# Patient Record
(demographics unavailable — no encounter records)

---

## 2025-03-07 NOTE — HISTORY OF PRESENT ILLNESS
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 States

## 2025-03-07 NOTE — HISTORY OF PRESENT ILLNESS
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 States

## 2025-03-07 NOTE — PHYSICAL EXAM
[Right] : right knee [NL (0)] : extension 0 degrees [5___] : hamstring 5[unfilled]/5 [Equivocal] : equivocal Arpita [] : patient ambulates without assistive device [TWNoteComboBox7] : flexion 105 degrees

## 2025-03-07 NOTE — DISCUSSION/SUMMARY
[de-identified] : General Dx Discussion The patient was advised of the diagnosis. The natural history of the pathology was explained in full to the patient in layman's terms. All questions were answered. The risks and benefits of surgical and non-surgical treatment alternatives were explained in full to the patient.  due to sudden pain and loss of motion with symptoms of locking will get a mri rt knee to eval for meniscal tear MCL sprain  will get a HKB  f/u after mri  will try mobic 7.5 mg po qd  Patient was given a prescription for an anti-inflammatory medication.  They will take it for the next 5-7 days and then on an as needed basis, as long as there are no medical contra-indications.  Patient is counseled on possible GI and blood pressure side effects. will use ice

## 2025-03-07 NOTE — DISCUSSION/SUMMARY
[de-identified] : General Dx Discussion The patient was advised of the diagnosis. The natural history of the pathology was explained in full to the patient in layman's terms. All questions were answered. The risks and benefits of surgical and non-surgical treatment alternatives were explained in full to the patient.  due to sudden pain and loss of motion with symptoms of locking will get a mri rt knee to eval for meniscal tear MCL sprain  will get a HKB  f/u after mri  will try mobic 7.5 mg po qd  Patient was given a prescription for an anti-inflammatory medication.  They will take it for the next 5-7 days and then on an as needed basis, as long as there are no medical contra-indications.  Patient is counseled on possible GI and blood pressure side effects. will use ice

## 2025-03-21 NOTE — PHYSICAL EXAM
[Right] : right knee [NL (0)] : extension 0 degrees [5___] : hamstring 5[unfilled]/5 [Equivocal] : equivocal Arpita [] : no pain with varus stress [TWNoteComboBox7] : flexion 105 degrees

## 2025-03-21 NOTE — DISCUSSION/SUMMARY
[de-identified] : General Dx Discussion The patient was advised of the diagnosis. The natural history of the pathology was explained in full to the patient in layman's terms. All questions were answered. The risks and benefits of surgical and non-surgical treatment alternatives were explained in full to the patient.  mri reviewed  advised best option is TKA she does not want surgery at this time  will request srinivas lt knee  f/u auth

## 2025-03-21 NOTE — HISTORY OF PRESENT ILLNESS
[Gradual] : gradual [Result of repetitive motion] : result of repetitive motion [3] : 3 [Dull/Aching] : dull/aching [Nothing helps with pain getting better] : Nothing helps with pain getting better [Full time] : Work status: full time [de-identified] : 03/21/2025: Patient is here for MRI results of her right knee.  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

## 2025-03-31 NOTE — DISCUSSION/SUMMARY
[de-identified] : General Dx Discussion The patient was advised of the diagnosis. The natural history of the pathology was explained in full to the patient in layman's terms. All questions were answered. The risks and benefits of surgical and non-surgical treatment alternatives were explained in full to the patient.  Case discussed. Will get a L/S MRI to eval the hnp. D/C mobic. Will start a medrol dose pack. Will also put in for visco-3. Follow up after MRI.    Entered by CHRIS Raymond acting as scribe. - The documentation recorded by the scribe accurately reflects the service I personally performed and the decisions made by me.

## 2025-03-31 NOTE — PHYSICAL EXAM
[NL (0)] : extension 0 degrees [5___] : hamstring 5[unfilled]/5 [Equivocal] : equivocal Arpita [Right] : right hip [] : no pain with varus stress [FreeTextEntry8] : Tenderness over distal hamstring. [TWNoteComboBox7] : flexion 105 degrees

## 2025-04-10 NOTE — HISTORY OF PRESENT ILLNESS
[Gradual] : gradual [Result of repetitive motion] : result of repetitive motion [0] : 0 [Dull/Aching] : dull/aching [Tightness] : tightness [Frequent] : frequent [Household chores] : household chores [Leisure] : leisure [Work] : work [Walking] : walking [6] : 6 [Meds] : meds [Not working due to injury] : Work status: not working due to injury [de-identified] : 4/10/2025-Isabel is here for MRI results of his lumbar spine and right knee. Pain has slightly improved.  3/31/25-Patient is here for a follow up on her right knee. Having increased pain since Thursday 3/27. Reports tightness in her calf and distal thigh. Has known l/s multi level hnp.  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

## 2025-04-10 NOTE — DATA REVIEWED
[MRI] : MRI [Lumbar Spine] : lumbar spine [Report was reviewed and noted in the chart] : The report was reviewed and noted in the chart [I independently reviewed and interpreted images and report] : I independently reviewed and interpreted images and report [I reviewed the films/CD] : I reviewed the films/CD [FreeTextEntry1] : spinal stenosis and foraminal stenosis disc bulge and degenerative disc disease

## 2025-04-10 NOTE — PROCEDURE
[Large Joint Injection] : Large joint injection [Right] : of the right [Knee] : knee [Pain] : pain [Inflammation] : inflammation [X-ray evidence of Osteoarthritis on this or prior visit] : x-ray evidence of Osteoarthritis on this or prior visit [Alcohol] : alcohol [Betadine] : betadine [Ethyl Chloride sprayed topically] : ethyl chloride sprayed topically [Sterile technique used] : sterile technique used [Visco-3 (25mg)] : 25mg of Visco-3 [] : Patient tolerated procedure well [Call if redness, pain or fever occur] : call if redness, pain or fever occur [Apply ice for 15min out of every hour for the next 12-24 hours as tolerated] : apply ice for 15 minutes out of every hour for the next 12-24 hours as tolerated [Previous OTC use and PT nontherapeutic] : patient has tried OTC's including aspirin, Ibuprofen, Aleve, etc or prescription NSAIDS, and/or exercises at home and/or physical therapy without satisfactory response [Patient had decreased mobility in the joint] : patient had decreased mobility in the joint [Risks, benefits, alternatives discussed / Verbal consent obtained] : the risks benefits, and alternatives have been discussed, and verbal consent was obtained [Prior failure or difficult injection] : prior failure or difficult injection [All ultrasound images have been permanently captured and stored accordingly in our picture archiving and communication system] : All ultrasound images have been permanently captured and stored accordingly in our picture archiving and communication system [Visualization of the needle and placement of injection was performed without complication] : visualization of the needle and placement of injection was performed without complication

## 2025-04-10 NOTE — REASON FOR VISIT
[FreeTextEntry2] : Follow up-right Knee  She has seen a neurologist and they have said she has no neurologic issues

## 2025-04-10 NOTE — DISCUSSION/SUMMARY
[de-identified] : General Dx Discussion The patient was advised of the diagnosis. The natural history of the pathology was explained in full to the patient in layman's terms. All questions were answered. The risks and benefits of surgical and non-surgical treatment alternatives were explained in full to the patient.  Case discussed. Visco3 injection started today understands she cannot have TKA for  3 months after her last shot  will start PT for her back and knee poss of seeing pain catalina discussed  mri reviewed  may resume work 4/15/25

## 2025-04-10 NOTE — HISTORY OF PRESENT ILLNESS
[Gradual] : gradual [Result of repetitive motion] : result of repetitive motion [0] : 0 [Dull/Aching] : dull/aching [Tightness] : tightness [Frequent] : frequent [Household chores] : household chores [Leisure] : leisure [Work] : work [Walking] : walking [6] : 6 [Meds] : meds [Not working due to injury] : Work status: not working due to injury [de-identified] : 4/10/2025-Isabel is here for MRI results of his lumbar spine and right knee. Pain has slightly improved.  3/31/25-Patient is here for a follow up on her right knee. Having increased pain since Thursday 3/27. Reports tightness in her calf and distal thigh. Has known l/s multi level hnp.  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

## 2025-04-10 NOTE — DISCUSSION/SUMMARY
[de-identified] : General Dx Discussion The patient was advised of the diagnosis. The natural history of the pathology was explained in full to the patient in layman's terms. All questions were answered. The risks and benefits of surgical and non-surgical treatment alternatives were explained in full to the patient.  Case discussed. Visco3 injection started today understands she cannot have TKA for  3 months after her last shot  will start PT for her back and knee poss of seeing pain catalina discussed  mri reviewed  may resume work 4/15/25

## 2025-04-10 NOTE — PHYSICAL EXAM
[Right] : right knee [NL (0)] : extension 0 degrees [5___] : hamstring 5[unfilled]/5 [Equivocal] : equivocal Arpita [Flexion] : flexion [Extension] : extension [] : no pain with varus stress [FreeTextEntry8] : Tenderness over distal hamstring. [TWNoteComboBox7] : flexion 105 degrees

## 2025-04-18 NOTE — HISTORY OF PRESENT ILLNESS
[Gradual] : gradual [Result of repetitive motion] : result of repetitive motion [6] : 6 [0] : 0 [Dull/Aching] : dull/aching [Tightness] : tightness [Frequent] : frequent [Household chores] : household chores [Leisure] : leisure [Work] : work [Meds] : meds [Walking] : walking [Not working due to injury] : Work status: not working due to injury [2] : 2 [Other:____] : [unfilled] [de-identified] : 04/18/2025: Patient is here for RT knee Visco3 #2.    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

## 2025-04-18 NOTE — DISCUSSION/SUMMARY
[de-identified] : General Dx Discussion The patient was advised of the diagnosis. The natural history of the pathology was explained in full to the patient in layman's terms. All questions were answered. The risks and benefits of surgical and non-surgical treatment alternatives were explained in full to the patient.  Case discussed. Visco-3 tolerated well. Ice as needed. Follow up in 1 week to continue the series.    Entered by CHRIS Raymond acting as scribe. - The documentation recorded by the scribe accurately reflects the service I personally performed and the decisions made by me.

## 2025-04-18 NOTE — PHYSICAL EXAM
[Right] : right knee [NL (0)] : extension 0 degrees [5___] : hamstring 5[unfilled]/5 [Equivocal] : equivocal Arpita [] : no pain with varus stress [FreeTextEntry8] : Tenderness over distal hamstring. [TWNoteComboBox7] : flexion 105 degrees

## 2025-04-18 NOTE — PROCEDURE
[Large Joint Injection] : Large joint injection [Right] : of the right [Knee] : knee [Pain] : pain [Inflammation] : inflammation [X-ray evidence of Osteoarthritis on this or prior visit] : x-ray evidence of Osteoarthritis on this or prior visit [Alcohol] : alcohol [Betadine] : betadine [Ethyl Chloride sprayed topically] : ethyl chloride sprayed topically [Sterile technique used] : sterile technique used [Visco-3 (25mg)] : 25mg of Visco-3 [#2] : series #2 [] : Patient tolerated procedure well [Call if redness, pain or fever occur] : call if redness, pain or fever occur [Apply ice for 15min out of every hour for the next 12-24 hours as tolerated] : apply ice for 15 minutes out of every hour for the next 12-24 hours as tolerated [Previous OTC use and PT nontherapeutic] : patient has tried OTC's including aspirin, Ibuprofen, Aleve, etc or prescription NSAIDS, and/or exercises at home and/or physical therapy without satisfactory response [Patient had decreased mobility in the joint] : patient had decreased mobility in the joint [Risks, benefits, alternatives discussed / Verbal consent obtained] : the risks benefits, and alternatives have been discussed, and verbal consent was obtained [Prior failure or difficult injection] : prior failure or difficult injection [All ultrasound images have been permanently captured and stored accordingly in our picture archiving and communication system] : All ultrasound images have been permanently captured and stored accordingly in our picture archiving and communication system [Visualization of the needle and placement of injection was performed without complication] : visualization of the needle and placement of injection was performed without complication

## 2025-04-28 NOTE — DISCUSSION/SUMMARY
[de-identified] : General Dx Discussion The patient was advised of the diagnosis. The natural history of the pathology was explained in full to the patient in layman's terms. All questions were answered. The risks and benefits of surgical and non-surgical treatment alternatives were explained in full to the patient.  Case discussed. Visco-3 tolerated well. Ice as needed. Follow up in 6 weeks.    Entered by CHRIS Raymond acting as scribe. - The documentation recorded by the scribe accurately reflects the service I personally performed and the decisions made by me.

## 2025-04-28 NOTE — PROCEDURE
[Large Joint Injection] : Large joint injection [Right] : of the right [Knee] : knee [Pain] : pain [Inflammation] : inflammation [X-ray evidence of Osteoarthritis on this or prior visit] : x-ray evidence of Osteoarthritis on this or prior visit [Alcohol] : alcohol [Betadine] : betadine [Ethyl Chloride sprayed topically] : ethyl chloride sprayed topically [Sterile technique used] : sterile technique used [Visco-3 (25mg)] : 25mg of Visco-3 [#3] : series #3 [] : Patient tolerated procedure well [Call if redness, pain or fever occur] : call if redness, pain or fever occur [Apply ice for 15min out of every hour for the next 12-24 hours as tolerated] : apply ice for 15 minutes out of every hour for the next 12-24 hours as tolerated [Previous OTC use and PT nontherapeutic] : patient has tried OTC's including aspirin, Ibuprofen, Aleve, etc or prescription NSAIDS, and/or exercises at home and/or physical therapy without satisfactory response [Patient had decreased mobility in the joint] : patient had decreased mobility in the joint [Risks, benefits, alternatives discussed / Verbal consent obtained] : the risks benefits, and alternatives have been discussed, and verbal consent was obtained [Prior failure or difficult injection] : prior failure or difficult injection [All ultrasound images have been permanently captured and stored accordingly in our picture archiving and communication system] : All ultrasound images have been permanently captured and stored accordingly in our picture archiving and communication system [Visualization of the needle and placement of injection was performed without complication] : visualization of the needle and placement of injection was performed without complication

## 2025-04-28 NOTE — HISTORY OF PRESENT ILLNESS
[Gradual] : gradual [Result of repetitive motion] : result of repetitive motion [6] : 6 [0] : 0 [Dull/Aching] : dull/aching [Tightness] : tightness [Frequent] : frequent [Household chores] : household chores [Leisure] : leisure [Work] : work [Meds] : meds [Walking] : walking [Not working due to injury] : Work status: not working due to injury [3] : 3 [Other:____] : [unfilled] [de-identified] : 04/28/2025: Patient is her for RT knee Visco 3 #3.    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

## 2025-04-28 NOTE — DISCUSSION/SUMMARY
[de-identified] : General Dx Discussion The patient was advised of the diagnosis. The natural history of the pathology was explained in full to the patient in layman's terms. All questions were answered. The risks and benefits of surgical and non-surgical treatment alternatives were explained in full to the patient.  Case discussed. Visco-3 tolerated well. Ice as needed. Follow up in 6 weeks.    Entered by CHRIS Raymond acting as scribe. - The documentation recorded by the scribe accurately reflects the service I personally performed and the decisions made by me.

## 2025-04-28 NOTE — HISTORY OF PRESENT ILLNESS
[Gradual] : gradual [Result of repetitive motion] : result of repetitive motion [6] : 6 [0] : 0 [Dull/Aching] : dull/aching [Tightness] : tightness [Frequent] : frequent [Household chores] : household chores [Leisure] : leisure [Work] : work [Meds] : meds [Walking] : walking [Not working due to injury] : Work status: not working due to injury [3] : 3 [Other:____] : [unfilled] [de-identified] : 04/28/2025: Patient is her for RT knee Visco 3 #3.    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

## 2025-06-09 NOTE — PHYSICAL EXAM
[Right] : right knee [NL (0)] : extension 0 degrees [5___] : hamstring 5[unfilled]/5 [Negative] : negative Estela's [] : no pain with varus stress [FreeTextEntry8] : Tenderness over distal hamstring. [TWNoteComboBox7] : flexion 110 degrees

## 2025-06-09 NOTE — DISCUSSION/SUMMARY
[de-identified] : General Dx Discussion The patient was advised of the diagnosis. The natural history of the pathology was explained in full to the patient in layman's terms. All questions were answered. The risks and benefits of surgical and non-surgical treatment alternatives were explained in full to the patient.  Case discussed. Visco-3 completed about 6 weeks ago without any relief of pain. Has no significant meniscal symptoms but has advanced PF OA.  x-rays shown to her.  She was given one last rx for mobic and flexeril. Patient was given a prescription for an anti-inflammatory medication.  They will take it for the next 5-7 days and then on an as needed basis, as long as there are no medical contra-indications.  Patient is counseled on possible GI and blood pressure side effects. Continue PT and wean to a HEP. Recommend consult with  for possible TKA.    Entered by CHRIS Raymond acting as scribe. - The documentation recorded by the scribe accurately reflects the service I personally performed and the decisions made by me.

## 2025-06-09 NOTE — HISTORY OF PRESENT ILLNESS
[Gradual] : gradual [Result of repetitive motion] : result of repetitive motion [6] : 6 [0] : 0 [Dull/Aching] : dull/aching [Tightness] : tightness [Frequent] : frequent [Household chores] : household chores [Leisure] : leisure [Work] : work [Meds] : meds [Walking] : walking [Not working due to injury] : Work status: not working due to injury [3] : 3 [Other:____] : [unfilled] [de-identified] : 06/09/2025: Patient is her for RT knee pain Pt completed Visco 3 series with no benefit 04/28/2025. Pt is attending physical therapy 2x a week with no relief. Pt has taken meloxicam and cyclobenzprine with minimal relief.     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